# Patient Record
Sex: FEMALE | Race: WHITE | Employment: OTHER | URBAN - METROPOLITAN AREA
[De-identification: names, ages, dates, MRNs, and addresses within clinical notes are randomized per-mention and may not be internally consistent; named-entity substitution may affect disease eponyms.]

---

## 2017-06-05 ENCOUNTER — HOSPITAL ENCOUNTER (OUTPATIENT)
Dept: LAB | Age: 77
Discharge: HOME | End: 2017-06-05

## 2017-06-05 PROCEDURE — 88305 TISSUE EXAM BY PATHOLOGIST: CPT | Performed by: INTERNAL MEDICINE

## 2017-06-21 ENCOUNTER — HOSPITAL ENCOUNTER (OUTPATIENT)
Dept: MAMMOGRAPHY | Age: 77
Discharge: HOME | End: 2017-06-21
Attending: FAMILY MEDICINE
Payer: MEDICARE

## 2017-06-21 DIAGNOSIS — Z12.31 VISIT FOR SCREENING MAMMOGRAM: ICD-10-CM

## 2017-06-21 PROCEDURE — 77067 SCR MAMMO BI INCL CAD: CPT

## 2017-06-23 PROBLEM — E11.40 TYPE 2 DIABETES MELLITUS WITH DIABETIC NEUROPATHY, WITHOUT LONG-TERM CURRENT USE OF INSULIN (HCC): Chronic | Status: ACTIVE | Noted: 2017-06-23

## 2017-09-05 ENCOUNTER — HOSPITAL ENCOUNTER (OUTPATIENT)
Dept: MRI IMAGING | Age: 77
Discharge: HOME | End: 2017-09-05
Attending: FAMILY MEDICINE
Payer: MEDICARE

## 2017-09-05 DIAGNOSIS — G89.29 CHRONIC RIGHT-SIDED LOW BACK PAIN WITH RIGHT-SIDED SCIATICA: Chronic | ICD-10-CM

## 2017-09-05 DIAGNOSIS — M54.41 CHRONIC RIGHT-SIDED LOW BACK PAIN WITH RIGHT-SIDED SCIATICA: Chronic | ICD-10-CM

## 2017-09-05 PROCEDURE — 72148 MRI LUMBAR SPINE W/O DYE: CPT

## 2017-10-05 PROBLEM — M48.061 SPINAL STENOSIS OF LUMBAR REGION: Chronic | Status: ACTIVE | Noted: 2017-10-05

## 2017-10-05 PROBLEM — M25.551 RIGHT HIP PAIN: Status: ACTIVE | Noted: 2017-10-05

## 2017-10-11 ENCOUNTER — HOSPITAL ENCOUNTER (OUTPATIENT)
Dept: GENERAL RADIOLOGY | Age: 77
Discharge: HOME | End: 2017-10-11
Payer: MEDICARE

## 2017-10-11 DIAGNOSIS — M25.551 RIGHT HIP PAIN: ICD-10-CM

## 2017-10-11 DIAGNOSIS — M48.061 SPINAL STENOSIS OF LUMBAR REGION, UNSPECIFIED WHETHER NEUROGENIC CLAUDICATION PRESENT: ICD-10-CM

## 2017-10-11 PROCEDURE — 72110 X-RAY EXAM L-2 SPINE 4/>VWS: CPT

## 2017-10-11 PROCEDURE — 73502 X-RAY EXAM HIP UNI 2-3 VIEWS: CPT

## 2017-12-06 ENCOUNTER — HOSPITAL ENCOUNTER (OUTPATIENT)
Dept: SURGERY | Age: 77
Discharge: HOME | End: 2017-12-06
Payer: MEDICARE

## 2017-12-06 VITALS
OXYGEN SATURATION: 100 % | BODY MASS INDEX: 30.92 KG/M2 | HEART RATE: 94 BPM | DIASTOLIC BLOOD PRESSURE: 70 MMHG | TEMPERATURE: 97.9 F | SYSTOLIC BLOOD PRESSURE: 127 MMHG | HEIGHT: 66 IN | RESPIRATION RATE: 18 BRPM | WEIGHT: 192.38 LBS

## 2017-12-06 LAB
GLUCOSE BLD STRIP.AUTO-MCNC: 88 MG/DL (ref 65–100)
HGB BLD-MCNC: 14.3 G/DL (ref 11.7–15.4)
POTASSIUM SERPL-SCNC: 4.8 MMOL/L (ref 3.5–5.1)

## 2017-12-06 PROCEDURE — 84132 ASSAY OF SERUM POTASSIUM: CPT | Performed by: ANESTHESIOLOGY

## 2017-12-06 PROCEDURE — 85018 HEMOGLOBIN: CPT | Performed by: ANESTHESIOLOGY

## 2017-12-06 PROCEDURE — 82962 GLUCOSE BLOOD TEST: CPT

## 2017-12-06 NOTE — PERIOP NOTES
Patient verified name, , and surgery as listed in [facility]. Patient provided medical/health information and PTA medications to the best of their ability. TYPE  CASE:lB  Orders per surgeon: were received and dated 2017  Labs per surgeon:per anesthesia. Labs per anesthesia protocol: hgb and potassium collected and sent to [facility] glucose 88 . Instructed  Patient that if blood sugar 300 or > , surgery may be cancelled. EKG  :  EKG is not required per protocol. Patient denies any chest pain or shortness of breath on exertion    Patient provided with and instructed on education handouts including Guide to Surgery, blood transfusions, pain management, and hand hygiene for the family and community, and [facility] brochure. Hibiclens and antibacterial soap instructions given per hospital policy. Instructed patient to continue previous medications as prescribed prior to surgery unless otherwise directed and to take the following medications the day of surgery according to anesthesia guidelines : amlodipine, tessalon if needed, celexa, bentyl and gabapentin . Instructed patient to hold  the following medications:all vitamins and supplements 7 days prior to surgery including Centrum and all nsaids 5 days prior to surgery including Meloxicam.    Original medication prescription bottles were visualized during patient appointment. Patient teach back successful and patient demonstrates knowledge of instruction.

## 2017-12-11 ENCOUNTER — ANESTHESIA EVENT (OUTPATIENT)
Dept: SURGERY | Age: 77
DRG: 520 | End: 2017-12-11
Payer: MEDICARE

## 2017-12-11 PROBLEM — M71.38 SYNOVIAL CYST OF LUMBAR SPINE: Chronic | Status: ACTIVE | Noted: 2017-12-11

## 2017-12-12 ENCOUNTER — ANESTHESIA (OUTPATIENT)
Dept: SURGERY | Age: 77
DRG: 520 | End: 2017-12-12
Payer: MEDICARE

## 2017-12-12 ENCOUNTER — APPOINTMENT (OUTPATIENT)
Dept: GENERAL RADIOLOGY | Age: 77
DRG: 520 | End: 2017-12-12
Attending: NEUROLOGICAL SURGERY
Payer: MEDICARE

## 2017-12-12 ENCOUNTER — HOSPITAL ENCOUNTER (INPATIENT)
Age: 77
LOS: 1 days | Discharge: HOME | DRG: 520 | End: 2017-12-13
Attending: NEUROLOGICAL SURGERY | Admitting: NEUROLOGICAL SURGERY
Payer: MEDICARE

## 2017-12-12 LAB
GLUCOSE BLD STRIP.AUTO-MCNC: 107 MG/DL (ref 65–100)
GLUCOSE BLD STRIP.AUTO-MCNC: 107 MG/DL (ref 65–100)
GLUCOSE BLD STRIP.AUTO-MCNC: 167 MG/DL (ref 65–100)
GLUCOSE BLD STRIP.AUTO-MCNC: 261 MG/DL (ref 65–100)

## 2017-12-12 PROCEDURE — 74011250636 HC RX REV CODE- 250/636

## 2017-12-12 PROCEDURE — 82962 GLUCOSE BLOOD TEST: CPT

## 2017-12-12 PROCEDURE — 74011000250 HC RX REV CODE- 250: Performed by: NEUROLOGICAL SURGERY

## 2017-12-12 PROCEDURE — 77030014007 HC SPNG HEMSTAT J&J -B: Performed by: NEUROLOGICAL SURGERY

## 2017-12-12 PROCEDURE — 74011250636 HC RX REV CODE- 250/636: Performed by: ANESTHESIOLOGY

## 2017-12-12 PROCEDURE — 76210000003 HC OR PH I REC 3.5 TO 4 HR: Performed by: NEUROLOGICAL SURGERY

## 2017-12-12 PROCEDURE — 65270000029 HC RM PRIVATE

## 2017-12-12 PROCEDURE — 77030010507 HC ADH SKN DERMBND J&J -B: Performed by: NEUROLOGICAL SURGERY

## 2017-12-12 PROCEDURE — 74011250636 HC RX REV CODE- 250/636: Performed by: NEUROLOGICAL SURGERY

## 2017-12-12 PROCEDURE — 77030020782 HC GWN BAIR PAWS FLX 3M -B: Performed by: ANESTHESIOLOGY

## 2017-12-12 PROCEDURE — 76060000034 HC ANESTHESIA 1.5 TO 2 HR: Performed by: NEUROLOGICAL SURGERY

## 2017-12-12 PROCEDURE — 77030018836 HC SOL IRR NACL ICUM -A: Performed by: NEUROLOGICAL SURGERY

## 2017-12-12 PROCEDURE — 77030003666 HC NDL SPINAL BD -A: Performed by: NEUROLOGICAL SURGERY

## 2017-12-12 PROCEDURE — 77030032490 HC SLV COMPR SCD KNE COVD -B: Performed by: NEUROLOGICAL SURGERY

## 2017-12-12 PROCEDURE — 77030008703 HC TU ET UNCUF COVD -A: Performed by: ANESTHESIOLOGY

## 2017-12-12 PROCEDURE — 74011000250 HC RX REV CODE- 250

## 2017-12-12 PROCEDURE — 76010000161 HC OR TIME 1 TO 1.5 HR INTENSV-TIER 1: Performed by: NEUROLOGICAL SURGERY

## 2017-12-12 PROCEDURE — 77030011640 HC PAD GRND REM COVD -A: Performed by: NEUROLOGICAL SURGERY

## 2017-12-12 PROCEDURE — 77030019908 HC STETH ESOPH SIMS -A: Performed by: ANESTHESIOLOGY

## 2017-12-12 PROCEDURE — 74011250637 HC RX REV CODE- 250/637: Performed by: NEUROLOGICAL SURGERY

## 2017-12-12 PROCEDURE — 77030004391 HC BUR FLUT MEDT -C: Performed by: NEUROLOGICAL SURGERY

## 2017-12-12 PROCEDURE — 74011636637 HC RX REV CODE- 636/637: Performed by: NEUROLOGICAL SURGERY

## 2017-12-12 PROCEDURE — 77030003029 HC SUT VCRL J&J -B: Performed by: NEUROLOGICAL SURGERY

## 2017-12-12 PROCEDURE — 0SB00ZZ EXCISION OF LUMBAR VERTEBRAL JOINT, OPEN APPROACH: ICD-10-PCS | Performed by: NEUROLOGICAL SURGERY

## 2017-12-12 PROCEDURE — 01NB0ZZ RELEASE LUMBAR NERVE, OPEN APPROACH: ICD-10-PCS | Performed by: NEUROLOGICAL SURGERY

## 2017-12-12 RX ORDER — INSULIN LISPRO 100 [IU]/ML
INJECTION, SOLUTION INTRAVENOUS; SUBCUTANEOUS
Status: DISCONTINUED | OUTPATIENT
Start: 2017-12-12 | End: 2017-12-13 | Stop reason: HOSPADM

## 2017-12-12 RX ORDER — SODIUM CHLORIDE 0.9 % (FLUSH) 0.9 %
5-10 SYRINGE (ML) INJECTION AS NEEDED
Status: DISCONTINUED | OUTPATIENT
Start: 2017-12-12 | End: 2017-12-13 | Stop reason: HOSPADM

## 2017-12-12 RX ORDER — SODIUM CHLORIDE 0.9 % (FLUSH) 0.9 %
5-10 SYRINGE (ML) INJECTION AS NEEDED
Status: DISCONTINUED | OUTPATIENT
Start: 2017-12-12 | End: 2017-12-12 | Stop reason: HOSPADM

## 2017-12-12 RX ORDER — LIDOCAINE HYDROCHLORIDE AND EPINEPHRINE 10; 10 MG/ML; UG/ML
INJECTION, SOLUTION INFILTRATION; PERINEURAL AS NEEDED
Status: DISCONTINUED | OUTPATIENT
Start: 2017-12-12 | End: 2017-12-12 | Stop reason: HOSPADM

## 2017-12-12 RX ORDER — GABAPENTIN 300 MG/1
900 CAPSULE ORAL 3 TIMES DAILY
Status: DISCONTINUED | OUTPATIENT
Start: 2017-12-12 | End: 2017-12-13 | Stop reason: HOSPADM

## 2017-12-12 RX ORDER — HEPARIN SODIUM 5000 [USP'U]/ML
5000 INJECTION, SOLUTION INTRAVENOUS; SUBCUTANEOUS EVERY 12 HOURS
Status: DISCONTINUED | OUTPATIENT
Start: 2017-12-12 | End: 2017-12-13 | Stop reason: HOSPADM

## 2017-12-12 RX ORDER — CITALOPRAM 20 MG/1
40 TABLET, FILM COATED ORAL DAILY
Status: DISCONTINUED | OUTPATIENT
Start: 2017-12-13 | End: 2017-12-13 | Stop reason: HOSPADM

## 2017-12-12 RX ORDER — MELOXICAM 7.5 MG/1
7.5 TABLET ORAL DAILY
Status: DISCONTINUED | OUTPATIENT
Start: 2017-12-13 | End: 2017-12-13 | Stop reason: HOSPADM

## 2017-12-12 RX ORDER — SODIUM CHLORIDE 0.9 % (FLUSH) 0.9 %
5-10 SYRINGE (ML) INJECTION EVERY 8 HOURS
Status: DISCONTINUED | OUTPATIENT
Start: 2017-12-12 | End: 2017-12-12 | Stop reason: HOSPADM

## 2017-12-12 RX ORDER — LIDOCAINE HYDROCHLORIDE 20 MG/ML
INJECTION, SOLUTION EPIDURAL; INFILTRATION; INTRACAUDAL; PERINEURAL AS NEEDED
Status: DISCONTINUED | OUTPATIENT
Start: 2017-12-12 | End: 2017-12-12 | Stop reason: HOSPADM

## 2017-12-12 RX ORDER — SODIUM CHLORIDE, SODIUM LACTATE, POTASSIUM CHLORIDE, CALCIUM CHLORIDE 600; 310; 30; 20 MG/100ML; MG/100ML; MG/100ML; MG/100ML
1000 INJECTION, SOLUTION INTRAVENOUS CONTINUOUS
Status: DISCONTINUED | OUTPATIENT
Start: 2017-12-12 | End: 2017-12-12 | Stop reason: HOSPADM

## 2017-12-12 RX ORDER — ROCURONIUM BROMIDE 10 MG/ML
INJECTION, SOLUTION INTRAVENOUS AS NEEDED
Status: DISCONTINUED | OUTPATIENT
Start: 2017-12-12 | End: 2017-12-12 | Stop reason: HOSPADM

## 2017-12-12 RX ORDER — OXYCODONE AND ACETAMINOPHEN 5; 325 MG/1; MG/1
2 TABLET ORAL
Status: DISCONTINUED | OUTPATIENT
Start: 2017-12-12 | End: 2017-12-13 | Stop reason: HOSPADM

## 2017-12-12 RX ORDER — ONDANSETRON 2 MG/ML
4 INJECTION INTRAMUSCULAR; INTRAVENOUS
Status: DISCONTINUED | OUTPATIENT
Start: 2017-12-12 | End: 2017-12-13 | Stop reason: HOSPADM

## 2017-12-12 RX ORDER — DICYCLOMINE HYDROCHLORIDE 10 MG/1
10 CAPSULE ORAL 3 TIMES DAILY
Status: DISCONTINUED | OUTPATIENT
Start: 2017-12-12 | End: 2017-12-13 | Stop reason: HOSPADM

## 2017-12-12 RX ORDER — POTASSIUM CHLORIDE AND SODIUM CHLORIDE 450; 150 MG/100ML; MG/100ML
INJECTION, SOLUTION INTRAVENOUS CONTINUOUS
Status: DISCONTINUED | OUTPATIENT
Start: 2017-12-12 | End: 2017-12-13 | Stop reason: HOSPADM

## 2017-12-12 RX ORDER — FENTANYL CITRATE 50 UG/ML
INJECTION, SOLUTION INTRAMUSCULAR; INTRAVENOUS AS NEEDED
Status: DISCONTINUED | OUTPATIENT
Start: 2017-12-12 | End: 2017-12-12 | Stop reason: HOSPADM

## 2017-12-12 RX ORDER — PIOGLITAZONEHYDROCHLORIDE 15 MG/1
15 TABLET ORAL DAILY
Status: DISCONTINUED | OUTPATIENT
Start: 2017-12-13 | End: 2017-12-13 | Stop reason: HOSPADM

## 2017-12-12 RX ORDER — DEXAMETHASONE SODIUM PHOSPHATE 4 MG/ML
INJECTION, SOLUTION INTRA-ARTICULAR; INTRALESIONAL; INTRAMUSCULAR; INTRAVENOUS; SOFT TISSUE AS NEEDED
Status: DISCONTINUED | OUTPATIENT
Start: 2017-12-12 | End: 2017-12-12 | Stop reason: HOSPADM

## 2017-12-12 RX ORDER — NALOXONE HYDROCHLORIDE 0.4 MG/ML
0.1 INJECTION, SOLUTION INTRAMUSCULAR; INTRAVENOUS; SUBCUTANEOUS AS NEEDED
Status: DISCONTINUED | OUTPATIENT
Start: 2017-12-12 | End: 2017-12-12 | Stop reason: HOSPADM

## 2017-12-12 RX ORDER — HYDROCHLOROTHIAZIDE 25 MG/1
25 TABLET ORAL DAILY
Status: DISCONTINUED | OUTPATIENT
Start: 2017-12-13 | End: 2017-12-13 | Stop reason: HOSPADM

## 2017-12-12 RX ORDER — FENTANYL CITRATE 50 UG/ML
100 INJECTION, SOLUTION INTRAMUSCULAR; INTRAVENOUS AS NEEDED
Status: DISCONTINUED | OUTPATIENT
Start: 2017-12-12 | End: 2017-12-12 | Stop reason: HOSPADM

## 2017-12-12 RX ORDER — NEOSTIGMINE METHYLSULFATE 1 MG/ML
INJECTION, SOLUTION INTRAVENOUS AS NEEDED
Status: DISCONTINUED | OUTPATIENT
Start: 2017-12-12 | End: 2017-12-12 | Stop reason: HOSPADM

## 2017-12-12 RX ORDER — SODIUM CHLORIDE, SODIUM LACTATE, POTASSIUM CHLORIDE, CALCIUM CHLORIDE 600; 310; 30; 20 MG/100ML; MG/100ML; MG/100ML; MG/100ML
75 INJECTION, SOLUTION INTRAVENOUS CONTINUOUS
Status: DISCONTINUED | OUTPATIENT
Start: 2017-12-12 | End: 2017-12-12 | Stop reason: HOSPADM

## 2017-12-12 RX ORDER — SIMVASTATIN 20 MG/1
20 TABLET, FILM COATED ORAL
Status: DISCONTINUED | OUTPATIENT
Start: 2017-12-12 | End: 2017-12-13 | Stop reason: HOSPADM

## 2017-12-12 RX ORDER — ONDANSETRON 2 MG/ML
INJECTION INTRAMUSCULAR; INTRAVENOUS AS NEEDED
Status: DISCONTINUED | OUTPATIENT
Start: 2017-12-12 | End: 2017-12-12 | Stop reason: HOSPADM

## 2017-12-12 RX ORDER — ONDANSETRON 2 MG/ML
4 INJECTION INTRAMUSCULAR; INTRAVENOUS ONCE
Status: DISCONTINUED | OUTPATIENT
Start: 2017-12-12 | End: 2017-12-12 | Stop reason: HOSPADM

## 2017-12-12 RX ORDER — AMLODIPINE BESYLATE 5 MG/1
2.5 TABLET ORAL DAILY
Status: DISCONTINUED | OUTPATIENT
Start: 2017-12-13 | End: 2017-12-13 | Stop reason: HOSPADM

## 2017-12-12 RX ORDER — MIDAZOLAM HYDROCHLORIDE 1 MG/ML
2 INJECTION, SOLUTION INTRAMUSCULAR; INTRAVENOUS
Status: DISCONTINUED | OUTPATIENT
Start: 2017-12-12 | End: 2017-12-12 | Stop reason: HOSPADM

## 2017-12-12 RX ORDER — CEFAZOLIN SODIUM IN 0.9 % NACL 2 G/100 ML
2 PLASTIC BAG, INJECTION (ML) INTRAVENOUS ONCE
Status: COMPLETED | OUTPATIENT
Start: 2017-12-12 | End: 2017-12-12

## 2017-12-12 RX ORDER — AMOXICILLIN 250 MG
2 CAPSULE ORAL DAILY
Status: DISCONTINUED | OUTPATIENT
Start: 2017-12-13 | End: 2017-12-13 | Stop reason: HOSPADM

## 2017-12-12 RX ORDER — LISINOPRIL 20 MG/1
20 TABLET ORAL DAILY
Status: DISCONTINUED | OUTPATIENT
Start: 2017-12-13 | End: 2017-12-13 | Stop reason: HOSPADM

## 2017-12-12 RX ORDER — CLONIDINE HYDROCHLORIDE 0.1 MG/1
0.1 TABLET ORAL
Status: DISCONTINUED | OUTPATIENT
Start: 2017-12-12 | End: 2017-12-13 | Stop reason: HOSPADM

## 2017-12-12 RX ORDER — HYDROMORPHONE HYDROCHLORIDE 2 MG/ML
0.5 INJECTION, SOLUTION INTRAMUSCULAR; INTRAVENOUS; SUBCUTANEOUS
Status: DISCONTINUED | OUTPATIENT
Start: 2017-12-12 | End: 2017-12-12 | Stop reason: HOSPADM

## 2017-12-12 RX ORDER — SODIUM CHLORIDE 0.9 % (FLUSH) 0.9 %
5-10 SYRINGE (ML) INJECTION EVERY 8 HOURS
Status: DISCONTINUED | OUTPATIENT
Start: 2017-12-12 | End: 2017-12-13 | Stop reason: HOSPADM

## 2017-12-12 RX ORDER — ACETAMINOPHEN 500 MG
500 TABLET ORAL ONCE
Status: DISCONTINUED | OUTPATIENT
Start: 2017-12-12 | End: 2017-12-12 | Stop reason: HOSPADM

## 2017-12-12 RX ORDER — METFORMIN HYDROCHLORIDE 500 MG/1
1000 TABLET ORAL 2 TIMES DAILY WITH MEALS
Status: DISCONTINUED | OUTPATIENT
Start: 2017-12-12 | End: 2017-12-13 | Stop reason: HOSPADM

## 2017-12-12 RX ORDER — OXYCODONE HYDROCHLORIDE 5 MG/1
5 TABLET ORAL
Status: DISCONTINUED | OUTPATIENT
Start: 2017-12-12 | End: 2017-12-12 | Stop reason: HOSPADM

## 2017-12-12 RX ORDER — OXYCODONE HYDROCHLORIDE 5 MG/1
10 TABLET ORAL
Status: DISCONTINUED | OUTPATIENT
Start: 2017-12-12 | End: 2017-12-12 | Stop reason: HOSPADM

## 2017-12-12 RX ORDER — GLYCOPYRROLATE 0.2 MG/ML
INJECTION INTRAMUSCULAR; INTRAVENOUS AS NEEDED
Status: DISCONTINUED | OUTPATIENT
Start: 2017-12-12 | End: 2017-12-12 | Stop reason: HOSPADM

## 2017-12-12 RX ORDER — PROPOFOL 10 MG/ML
INJECTION, EMULSION INTRAVENOUS AS NEEDED
Status: DISCONTINUED | OUTPATIENT
Start: 2017-12-12 | End: 2017-12-12 | Stop reason: HOSPADM

## 2017-12-12 RX ORDER — NALOXONE HYDROCHLORIDE 0.4 MG/ML
0.4 INJECTION, SOLUTION INTRAMUSCULAR; INTRAVENOUS; SUBCUTANEOUS AS NEEDED
Status: DISCONTINUED | OUTPATIENT
Start: 2017-12-12 | End: 2017-12-13 | Stop reason: HOSPADM

## 2017-12-12 RX ORDER — CYCLOBENZAPRINE HCL 10 MG
10 TABLET ORAL
Status: DISCONTINUED | OUTPATIENT
Start: 2017-12-12 | End: 2017-12-13 | Stop reason: HOSPADM

## 2017-12-12 RX ORDER — ACETAMINOPHEN 325 MG/1
650 TABLET ORAL
Status: DISCONTINUED | OUTPATIENT
Start: 2017-12-12 | End: 2017-12-13 | Stop reason: HOSPADM

## 2017-12-12 RX ORDER — DIPHENHYDRAMINE HYDROCHLORIDE 50 MG/ML
12.5 INJECTION, SOLUTION INTRAMUSCULAR; INTRAVENOUS ONCE
Status: DISCONTINUED | OUTPATIENT
Start: 2017-12-12 | End: 2017-12-12 | Stop reason: HOSPADM

## 2017-12-12 RX ORDER — MORPHINE SULFATE 10 MG/ML
10 INJECTION, SOLUTION INTRAMUSCULAR; INTRAVENOUS
Status: DISCONTINUED | OUTPATIENT
Start: 2017-12-12 | End: 2017-12-13 | Stop reason: HOSPADM

## 2017-12-12 RX ORDER — LIDOCAINE HYDROCHLORIDE 10 MG/ML
0.1 INJECTION INFILTRATION; PERINEURAL AS NEEDED
Status: DISCONTINUED | OUTPATIENT
Start: 2017-12-12 | End: 2017-12-12 | Stop reason: HOSPADM

## 2017-12-12 RX ADMIN — ROCURONIUM BROMIDE 10 MG: 10 INJECTION, SOLUTION INTRAVENOUS at 11:53

## 2017-12-12 RX ADMIN — ONDANSETRON 4 MG: 2 INJECTION INTRAMUSCULAR; INTRAVENOUS at 12:24

## 2017-12-12 RX ADMIN — SIMVASTATIN 20 MG: 20 TABLET, FILM COATED ORAL at 23:13

## 2017-12-12 RX ADMIN — ROCURONIUM BROMIDE 40 MG: 10 INJECTION, SOLUTION INTRAVENOUS at 11:35

## 2017-12-12 RX ADMIN — GABAPENTIN 900 MG: 300 CAPSULE ORAL at 18:00

## 2017-12-12 RX ADMIN — SODIUM CHLORIDE AND POTASSIUM CHLORIDE: 4.5; 1.49 INJECTION, SOLUTION INTRAVENOUS at 18:03

## 2017-12-12 RX ADMIN — Medication 10 ML: at 23:15

## 2017-12-12 RX ADMIN — OXYCODONE HYDROCHLORIDE AND ACETAMINOPHEN 2 TABLET: 5; 325 TABLET ORAL at 23:29

## 2017-12-12 RX ADMIN — Medication 10 ML: at 17:00

## 2017-12-12 RX ADMIN — OXYCODONE HYDROCHLORIDE AND ACETAMINOPHEN 2 TABLET: 5; 325 TABLET ORAL at 18:10

## 2017-12-12 RX ADMIN — FENTANYL CITRATE 100 MCG: 50 INJECTION, SOLUTION INTRAMUSCULAR; INTRAVENOUS at 11:33

## 2017-12-12 RX ADMIN — DICYCLOMINE HYDROCHLORIDE 10 MG: 10 CAPSULE ORAL at 23:13

## 2017-12-12 RX ADMIN — NEOSTIGMINE METHYLSULFATE 3 MG: 1 INJECTION, SOLUTION INTRAVENOUS at 12:46

## 2017-12-12 RX ADMIN — METFORMIN HYDROCHLORIDE 1000 MG: 500 TABLET, FILM COATED ORAL at 18:00

## 2017-12-12 RX ADMIN — LIDOCAINE HYDROCHLORIDE 100 MG: 20 INJECTION, SOLUTION EPIDURAL; INFILTRATION; INTRACAUDAL; PERINEURAL at 11:35

## 2017-12-12 RX ADMIN — PROPOFOL 200 MG: 10 INJECTION, EMULSION INTRAVENOUS at 11:35

## 2017-12-12 RX ADMIN — GLYCOPYRROLATE 0.4 MG: 0.2 INJECTION INTRAMUSCULAR; INTRAVENOUS at 12:46

## 2017-12-12 RX ADMIN — HEPARIN SODIUM 5000 UNITS: 5000 INJECTION, SOLUTION INTRAVENOUS; SUBCUTANEOUS at 23:13

## 2017-12-12 RX ADMIN — DICYCLOMINE HYDROCHLORIDE 10 MG: 10 CAPSULE ORAL at 18:00

## 2017-12-12 RX ADMIN — GABAPENTIN 900 MG: 300 CAPSULE ORAL at 23:13

## 2017-12-12 RX ADMIN — CEFAZOLIN 2 G: 10 INJECTION, POWDER, FOR SOLUTION INTRAVENOUS; PARENTERAL at 11:40

## 2017-12-12 RX ADMIN — DEXAMETHASONE SODIUM PHOSPHATE 4 MG: 4 INJECTION, SOLUTION INTRA-ARTICULAR; INTRALESIONAL; INTRAMUSCULAR; INTRAVENOUS; SOFT TISSUE at 11:42

## 2017-12-12 RX ADMIN — SODIUM CHLORIDE, SODIUM LACTATE, POTASSIUM CHLORIDE, AND CALCIUM CHLORIDE 1000 ML: 600; 310; 30; 20 INJECTION, SOLUTION INTRAVENOUS at 10:40

## 2017-12-12 RX ADMIN — SODIUM CHLORIDE, SODIUM LACTATE, POTASSIUM CHLORIDE, AND CALCIUM CHLORIDE 75 ML: 600; 310; 30; 20 INJECTION, SOLUTION INTRAVENOUS at 13:43

## 2017-12-12 NOTE — IP AVS SNAPSHOT
Jacobo Ego 
 
 
 [address] [facility] [address] [facility] 
[phone] Patient: Kenny Wesley MRN: [id number] P:[id number] About your hospitalization You were admitted on:  December 12, 2017 You last received care in the:  [facility] 7 MED SURG You were discharged on:  December 13, 2017 Why you were hospitalized Your primary diagnosis was:  Not on File Your diagnoses also included:  Synovial Cyst Of Lumbar Spine Things You Need To Do (next 8 weeks) Follow up with Sofia Khan MD  
  
Phone:  [phone] Where:  [facility] [address] Thursday Dec 21, 2017 WOUND CHECK with [facility] NURSE at  9:30 AM  
Where:  [facility] Follow up with Leona Hargrove MD  
at 9:30 a.m. Phone:  [phone] Where:  [place], [address] [employer] [facility], [address], [address] [address] Discharge Orders None A check reinier indicates which time of day the medication should be taken. My Medications STOP taking these medications HYDROcodone-acetaminophen 5-325 mg per tablet Commonly known as:  [address] these medications as instructed Instructions Each Dose to Equal  
 Morning Noon Evening Bedtime  
 amLODIPine 2.5 mg tablet Commonly known as:  Doll German Your next dose is:  Tomorrow Morning Take 1 Tab by mouth daily. Indications: hypertension 2.5 mg CENTRUM SILVER PO Your next dose is:  Tomorrow Morning Take 1 Tab by mouth daily. 1 Tab  
    
  
   
   
   
  
 citalopram 40 mg tablet Commonly known as:  Viona Creamer Your next dose is:  Tomorrow Morning TAKE 1 TABLET BY MOUTH DAILY. cyclobenzaprine 10 mg tablet Commonly known as:  FLEXERIL Your next dose is: Take on as needed schedule TAKE 1 TABLET THREE TIMES DAILY AS NEEDED FOR MUSCLE SPASM(S)  
     
   
   
   
  
 dicyclomine 10 mg capsule Commonly known as:  BENTYL Your next dose is:  TODAY with meals TAKE 1 CAPSULE THREE TIMES DAILY  
     
  
   
  
   
  
   
  
 gabapentin 300 mg capsule Commonly known as:  NEURONTIN Your next dose is:  TODAY evening and bedtime Take 3 Caps by mouth three (3) times daily. 900 mg  
    
  
   
   
  
   
  
  
 glipiZIDE 5 mg tablet Commonly known as:  Colette Isles Your next dose is: This evening TAKE 1 TABLET TWICE DAILY  
     
  
   
   
  
   
  
 hydroCHLOROthiazide 25 mg tablet Commonly known as:  HYDRODIURIL Your next dose is:  Tomorrow Morning TAKE 1 TABLET BY MOUTH EVERY DAY  
     
  
   
   
   
  
 lisinopril 20 mg tablet Commonly known as:  Renetta Hernandez Your next dose is:  Tomorrow Morning Take  by mouth daily. meloxicam 7.5 mg tablet Commonly known as:  MOBIC Your next dose is:  Tomorrow Morning Take 1 Tab by mouth daily. 7.5 mg  
    
  
   
   
   
  
 metFORMIN 1,000 mg tablet Commonly known as:  GLUCOPHAGE Your next dose is: This evening TAKE 1 TABLET TWICE DAILY WITH MEALS  
     
  
   
   
  
   
  
 oxyCODONE-acetaminophen 5-325 mg per tablet Commonly known as:  PERCOCET Your next dose is: Take on as needed schedule 1-2 tabs po q4-6h prn pain  
     
   
   
   
  
 pioglitazone 15 mg tablet Commonly known as:  ACTOS Your next dose is:  Tomorrow Morning TAKE 1 TABLET EVERY DAY  
     
  
   
   
   
  
 senna-docusate 8.6-50 mg per tablet Commonly known as:  Gurvinder Sequeira Your next dose is:  Tomorrow Morning Take 2 Tabs by mouth daily. 2 Tab  
    
  
   
   
   
  
 simvastatin 40 mg tablet Commonly known as:  ZOCOR Your next dose is: Take tonight TAKE 1 TABLET EVERY NIGHT Where to Get Your Medications Information on where to get these meds will be given to you by the nurse or doctor. ! Ask your nurse or doctor about these medications  
  oxyCODONE-acetaminophen 5-325 mg per tablet  
 senna-docusate 8.6-50 mg per tablet Discharge Instructions MAY shower-->NO tub baths CHANGE dressing DAILYremove dressing-->shower-->apply new dressing NO lifting anything heavier than 5LBS  
 
WEAR brace at all times EXCEPT when in bed, just going to the bathroom or showering NO Bending, Lifting or Twisting Avoid sitting more than 20 - 30 minutes at a time NO driving until directed by Dr. Samantha Knox DO NOT take any NSAIDS (either prescribed or over the counter until directed (Aleve, Ibuprofen, Mobic, etc) as this will interfere with bone healing CALL DR. Yang if:  Fever >100.5 
([phone])               Incision becomes red, swollen or opens up Incision has yellow, thick drainage or an odor Pain is not managed with prescribed medications Excessive nausea and/or vomiting Avoid having pets sleep in bed with you until incision is completely healed DISCHARGE SUMMARY from Nurse PATIENT INSTRUCTIONS: 
 
 
F-face looks uneven A-arms unable to move or move unevenly S-speech slurred or non-existent T-time-call 911 as soon as signs and symptoms begin-DO NOT go Back to bed or wait to see if you get better-TIME IS BRAIN. Warning Signs of HEART ATTACK Call 911 if you have these symptoms: 
? Chest discomfort. Most heart attacks involve discomfort in the center of the chest that lasts more than a few minutes, or that goes away and comes back. It can feel like uncomfortable pressure, squeezing, fullness, or pain. ? Discomfort in other areas of the upper body. Symptoms can include pain or discomfort in one or both arms, the back, neck, jaw, or stomach. ? Shortness of breath with or without chest discomfort. ? Other signs may include breaking out in a cold sweat, nausea, or lightheadedness. Don't wait more than five minutes to call [address]! Fast action can save your life. Calling 911 is almost always the fastest way to get lifesaving treatment. Emergency Medical Services staff can begin treatment when they arrive  up to an hour sooner than if someone gets to the hospital by car. The discharge information has been reviewed with the patient. The patient verbalized understanding. Discharge medications reviewed with the patient and appropriate educational materials and side effects teaching were provided. ___________________________________________________________________________________________________________________________________ Introducing [facility]! Gracy Arreola introduces [employer] patient portal. Now you can access parts of your medical record, email your doctor's office, and request medication refills online. 1. In your internet browser, go to [email or web address] 2. Click on the First Time User? Click Here link in the Sign In box. You will see the New Member Sign Up page. 3. Enter your [employer] Access Code exactly as it appears below. You will not need to use this code after youve completed the sign-up process. If you do not sign up before the expiration date, you must request a new code. · [employer] Access Code: [email or web address] Expires: 2/3/2018 12:27 PM 
 
4. Enter the last four digits of your Social Security Number (xxxx) and Date of Birth (mm/dd/yyyy) as indicated and click Submit. You will be taken to the next sign-up page. 5. Create a MyChart ID.  This will be your [employer] login ID and cannot be changed, so think of one that is secure and easy to remember. 6. Create a [employer] password. You can change your password at any time. 7. Enter your Password Reset Question and Answer. This can be used at a later time if you forget your password. 8. Enter your e-mail address. You will receive e-mail notification when new information is available in [address] 9. Click Sign Up. You can now view and download portions of your medical record. 10. Click the Download Summary menu link to download a portable copy of your medical information. If you have questions, please visit the Frequently Asked Questions section of the [employer] website. Remember, [employer] is NOT to be used for urgent needs. For medical emergencies, dial 911. Now available from your iPhone and Android! Unresulted Labs-Please follow up with your PCP about these lab tests Order Current Status [place] X[facility] TECHNOLOGIST SERVICE In process Providers Seen During Your Hospitalization Provider Specialty Primary office phone Carlos Baca MD Neurosurgery [phone] Your Primary Care Physician (PCP) Primary Care Physician Office Phone Office Fax Maribell Gonsalo [phone] You are allergic to the following Allergen Reactions Amoxicillin Vertigo Januvia (Sitagliptin) Myalgia Lyrica (Pregabalin) Swelling Recent Documentation Breastfeeding? OB Status Smoking Status No Hysterectomy Current Every Day Smoker Emergency Contacts Name Discharge Info Relation Home Work Mobile Lucy Pabon DISCHARGE CAREGIVER [3] Child [2] [phone] Patient Belongings The following personal items are in your possession at time of discharge: 
  Dental Appliances: Lowers, Uppers, With patient  Visual Aid: Glasses   Hearing Aids/Status: Does not own Please provide this summary of care documentation to your next provider. Signatures-by signing, you are acknowledging that this After Visit Summary has been reviewed with you and you have received a copy. Patient Signature:  ____________________________________________________________ Date:  ____________________________________________________________  
  
[employer] Provider Signature:  ____________________________________________________________ Date:  ____________________________________________________________

## 2017-12-12 NOTE — ANESTHESIA POSTPROCEDURE EVALUATION
Post-Anesthesia Evaluation and Assessment    Patient: Abelardo Castillo MRN: [id number]  SSN: [id number]    YOB: 1940  Age: 78 y.o. Sex: female       Cardiovascular Function/Vital Signs  Visit Vitals    /71    Pulse 75    Temp 36.6 °C (97.8 °F)    Resp 28    SpO2 98%       Patient is status post general anesthesia for Procedure(s):  RIGHT L3 4 LAMINOTOMY FOR SYNOVIAL CYST. Nausea/Vomiting: None    Postoperative hydration reviewed and adequate. Pain:  Pain Scale 1: Numeric (0 - 10) (12/12/17 [facility])  Pain Intensity 1: 0 (12/12/17 1355)   Managed    Neurological Status:   Neuro (WDL): Exceptions to WDL (12/12/17 1355)  Neuro  Neurologic State: Drowsy (12/12/17 1355)  LUE Motor Response: Purposeful (12/12/17 1355)  LLE Motor Response: Purposeful (12/12/17 1355)  RUE Motor Response: Purposeful (12/12/17 [facility])  RLE Motor Response: Purposeful (12/12/17 1355)   At baseline    Mental Status and Level of Consciousness: Arousable    Pulmonary Status:   O2 Device: Nasal cannula (12/12/17 1355)   Adequate oxygenation and airway patent    Complications related to anesthesia: None    Post-anesthesia assessment completed.  No concerns    Signed By: Lenore Busch MD     December 12, 2017

## 2017-12-12 NOTE — PROGRESS NOTES
provided pre-op prayer visit. Sister and daughter were present for visit. Pt  Shared her francisco.  provided spiritual care through presence, active listening, pastoral conversation and prayer.

## 2017-12-12 NOTE — INTERVAL H&P NOTE
H&P Update: Colin Diaz was seen and examined. History and physical has been reviewed. The patient has been examined.  There have been no significant clinical changes since the completion of the originally dated History and Physical.    Signed By: Kayden Block MD     December 12, 2017 11:07 AM

## 2017-12-12 NOTE — ROUTINE PROCESS
TRANSFER - IN REPORT:    Verbal report received from Kelly Staley RN on Trini Sharif  being received from PACU for routine post - op      Report consisted of patients Situation, Background, Assessment and   Recommendations(SBAR). Information from the following report(s) SBAR, Kardex and MAR was reviewed with the receiving nurse. Opportunity for questions and clarification was provided. Report handed to primary nurse, Alena Bojorquez.

## 2017-12-12 NOTE — ANESTHESIA PREPROCEDURE EVALUATION
Anesthetic History   No history of anesthetic complications            Review of Systems / Medical History  Patient summary reviewed and pertinent labs reviewed    Pulmonary          Smoker         Neuro/Psych         Psychiatric history     Cardiovascular    Hypertension              Exercise tolerance: >4 METS     GI/Hepatic/Renal     GERD           Endo/Other    Diabetes    Obesity and arthritis     Other Findings              Physical Exam    Airway  Mallampati: II  TM Distance: 4 - 6 cm  Neck ROM: normal range of motion   Mouth opening: Normal     Cardiovascular    Rhythm: regular  Rate: normal         Dental  No notable dental hx       Pulmonary  Breath sounds clear to auscultation               Abdominal  GI exam deferred       Other Findings            Anesthetic Plan    ASA: 2  Anesthesia type: general          Induction: Intravenous  Anesthetic plan and risks discussed with: Patient

## 2017-12-12 NOTE — OP NOTES
Montana Heller [id number]  [id number]    1940  78 y.o.  female       Operative Report    Date of Surgery: 12/12/2017     Preoperative Diagnosis: RIGHT L3-4 SYNOVIAL CYST WITH NERVE ROOT COMPRESSION    Postoperative Diagnosis: SAME    Surgeon(s) and Role:     * Herbert Dooley MD - Primary    Anesthesia: General     Procedure:   1. Right L3-L4 partial hemilaminectomy, medial facetectomy and foraminotomy for removal of synovial cyst and decompression of nerves. 2. Microsurgery. 3. Intraoperative fluoroscopic guidance. Procedure in Detail:   After appropriate informed consent was obtained, the patient was taken to the operating suite where satisfactory general anesthesia was induced. The patient was then turned into the prone position on the operating table on chest rolls. All pressure points were carefully padded. Perioperative antibiotics were given. The lumbar region was prepped and draped in the usual sterile fashion. Intraoperative fluoroscopy was used to localize the L3-L4 level. The skin was infiltrated with 1% lidocaine with epinephrine. A 16 mm vertical linear incision was then made directly over the L3-L4 level 1.5 cm to the right of midline. Dissection was carried down through the subcutaneous tissue. The fascia was perforated using a K wire. The sequential dilators were passed over the K wire and were docked on the L3-L4 laminae under fluoroscopic guidance. A tubular retractor was passed over the dilators and was also docked on the L3-L4 laminae under fluoroscopic guidance. The retractor was secured firmly in place using the articulating arm. The dilators were removed from the wound. The operating microscope was brought into the field. Next, a small amount of soft tissue was removed from the right L3-L4 laminae. The Midas-Corbin high speed drill and Kerrison rongeur were used to perform a right L3-L4 partial hemilaminectomy. A medial facetectomy was also carried out.   The thickened ligament was removed with care being given to protection of the underlying neural elements. A moderately large synovial cyst was identified filling the lateral recess. The right L4 nerve root was identified and was gently mobilized using microsurgical technique. The cyst was then carefully dissected away from the nerve root and was completely removed in a piecemeal fashion. A generous foraminotomy was carried out over the right L4 nerve root. The nerves were then inspected and were found to be completely free. The wound was then irrigated copiously with antibiotic solution. Meticulous hemostasis was obtained. The operating microscope was removed from the field. The tubular retractor was removed from the wound. The fascia was closed using a single 0 Vicryl suture. The subcutaneous tissue was closed in a layered fashion. The skin edges were approximated using Dermabond. A sterile dressing was applied overall. The patient was then turned back into the supine position on the stretcher where satisfactory general anesthesia was reversed. The patient was then taken to the recovery room in satisfactory condition. Estimated Blood Loss:   25cc           Specimens:  None    Drains: None                Complications: None    Counts: Sponge and needle counts were correct times two.     Signed By:  Calli Livingston MD     December 12, 2017

## 2017-12-12 NOTE — PERIOP NOTES
TRANSFER - OUT REPORT:    Verbal report given to Diamante Joseph (name) on [facility]  being transferred to [facility](unit) for routine post - op       Report consisted of patients Situation, Background, Assessment and   Recommendations(SBAR). Information from the following report(s) SBAR, OR Summary, Intake/Output and MAR was reviewed with the receiving nurse. Lines:   Peripheral IV 12/12/17 Left Hand (Active)   Site Assessment Clean, dry, & intact 12/12/2017  1:55 PM   Phlebitis Assessment 0 12/12/2017  1:55 PM   Infiltration Assessment 0 12/12/2017  1:55 PM   Dressing Status Clean, dry, & intact 12/12/2017  1:55 PM   Dressing Type Transparent 12/12/2017  1:55 PM   Hub Color/Line Status Infusing 12/12/2017  1:55 PM        Opportunity for questions and clarification was provided. Patient transported with:   O2 @ 2 liters    VTE prophylaxis orders have been written for [facility]. Patient and family given floor number and nurses name. Family updated re: pt status after security code verified.

## 2017-12-13 VITALS
SYSTOLIC BLOOD PRESSURE: 121 MMHG | OXYGEN SATURATION: 94 % | RESPIRATION RATE: 20 BRPM | DIASTOLIC BLOOD PRESSURE: 68 MMHG | HEART RATE: 77 BPM | TEMPERATURE: 98.3 F

## 2017-12-13 LAB — GLUCOSE BLD STRIP.AUTO-MCNC: 102 MG/DL (ref 65–100)

## 2017-12-13 PROCEDURE — 74011250637 HC RX REV CODE- 250/637: Performed by: NEUROLOGICAL SURGERY

## 2017-12-13 PROCEDURE — 82962 GLUCOSE BLOOD TEST: CPT

## 2017-12-13 RX ORDER — AMOXICILLIN 250 MG
2 CAPSULE ORAL DAILY
Qty: 30 TAB | Refills: 1 | Status: SHIPPED | OUTPATIENT
Start: 2017-12-13 | End: 2018-06-29

## 2017-12-13 RX ORDER — OXYCODONE AND ACETAMINOPHEN 5; 325 MG/1; MG/1
TABLET ORAL
Qty: 40 TAB | Refills: 0 | Status: SHIPPED | OUTPATIENT
Start: 2017-12-13 | End: 2018-06-29

## 2017-12-13 RX ADMIN — AMLODIPINE BESYLATE 2.5 MG: 5 TABLET ORAL at 08:06

## 2017-12-13 RX ADMIN — MELOXICAM 7.5 MG: 7.5 TABLET ORAL at 08:06

## 2017-12-13 RX ADMIN — CITALOPRAM HYDROBROMIDE 40 MG: 20 TABLET ORAL at 08:05

## 2017-12-13 RX ADMIN — PIOGLITAZONE HYDROCHLORIDE 15 MG: 15 TABLET ORAL at 08:05

## 2017-12-13 RX ADMIN — DICYCLOMINE HYDROCHLORIDE 10 MG: 10 CAPSULE ORAL at 08:05

## 2017-12-13 RX ADMIN — Medication 1 TABLET: at 08:05

## 2017-12-13 RX ADMIN — Medication 10 ML: at 05:03

## 2017-12-13 RX ADMIN — LISINOPRIL 20 MG: 20 TABLET ORAL at 08:06

## 2017-12-13 RX ADMIN — HYDROCHLOROTHIAZIDE 25 MG: 25 TABLET ORAL at 08:06

## 2017-12-13 RX ADMIN — METFORMIN HYDROCHLORIDE 1000 MG: 500 TABLET, FILM COATED ORAL at 08:05

## 2017-12-13 RX ADMIN — OXYCODONE HYDROCHLORIDE AND ACETAMINOPHEN 2 TABLET: 5; 325 TABLET ORAL at 05:11

## 2017-12-13 RX ADMIN — STANDARDIZED SENNA CONCENTRATE AND DOCUSATE SODIUM 2 TABLET: 8.6; 5 TABLET, FILM COATED ORAL at 08:05

## 2017-12-13 RX ADMIN — GABAPENTIN 900 MG: 300 CAPSULE ORAL at 08:05

## 2017-12-13 NOTE — DISCHARGE INSTRUCTIONS
MAY shower-->NO tub baths    CHANGE dressing DAILY--remove dressing-->shower-->apply new dressing    NO lifting anything heavier than 5LBS     WEAR brace at all times EXCEPT when in bed, just going to the bathroom or showering     NO Bending, Lifting or Twisting    Avoid sitting more than 20 - 30 minutes at a time    NO driving until directed by Dr. Mercedes Prudent    DO NOT take any NSAIDS (either prescribed or over the counter until directed    (Aleve, Ibuprofen, Mobic, etc) as this will interfere with bone healing    CALL DR. Mercedes Prudent if:  Fever >100.5  ([phone])               Incision becomes red, swollen or opens up             Incision has yellow, thick drainage or an odor             Pain is not managed with prescribed medications             Excessive nausea and/or vomiting    Avoid having pets sleep in bed with you until incision is completely healed    DISCHARGE SUMMARY from Nurse    PATIENT INSTRUCTIONS:    After general anesthesia or intravenous sedation, for 24 hours or while taking prescription Narcotics:  · Limit your activities  · Do not drive and operate hazardous machinery  · Do not make important personal or business decisions  · Do  not drink alcoholic beverages  · If you have not urinated within 8 hours after discharge, please contact your surgeon on call. Report the following to your surgeon:  · Excessive pain, swelling, redness or odor of or around the surgical area  · Temperature over 100.5  · Nausea and vomiting lasting longer than 4 hours or if unable to take medications  · Any signs of decreased circulation or nerve impairment to extremity: change in color, persistent  numbness, tingling, coldness or increase pain  · Any questions    What to do at Home:  Recommended activity: See surgical instructions, diet as tolerated. *  Please give a list of your current medications to your Primary Care Provider.     *  Please update this list whenever your medications are discontinued, doses are      changed, or new medications (including over-the-counter products) are added. *  Please carry medication information at all times in case of emergency situations. These are general instructions for a healthy lifestyle:    No smoking/ No tobacco products/ Avoid exposure to second hand smoke  Surgeon General's Warning:  Quitting smoking now greatly reduces serious risk to your health. Obesity, smoking, and sedentary lifestyle greatly increases your risk for illness    A healthy diet, regular physical exercise & weight monitoring are important for maintaining a healthy lifestyle    You may be retaining fluid if you have a history of heart failure or if you experience any of the following symptoms:  Weight gain of 3 pounds or more overnight or 5 pounds in a week, increased swelling in our hands or feet or shortness of breath while lying flat in bed. Please call your doctor as soon as you notice any of these symptoms; do not wait until your next office visit. Recognize signs and symptoms of STROKE:    F-face looks uneven    A-arms unable to move or move unevenly    S-speech slurred or non-existent    T-time-call 911 as soon as signs and symptoms begin-DO NOT go       Back to bed or wait to see if you get better-TIME IS BRAIN. Warning Signs of HEART ATTACK     Call 911 if you have these symptoms:   Chest discomfort. Most heart attacks involve discomfort in the center of the chest that lasts more than a few minutes, or that goes away and comes back. It can feel like uncomfortable pressure, squeezing, fullness, or pain.  Discomfort in other areas of the upper body. Symptoms can include pain or discomfort in one or both arms, the back, neck, jaw, or stomach.  Shortness of breath with or without chest discomfort.  Other signs may include breaking out in a cold sweat, nausea, or lightheadedness. Don't wait more than five minutes to call 911 - MINUTES MATTER! Fast action can save your life.  Calling 911 is almost always the fastest way to get lifesaving treatment. Emergency Medical Services staff can begin treatment when they arrive -- up to an hour sooner than if someone gets to the hospital by car. The discharge information has been reviewed with the patient. The patient verbalized understanding. Discharge medications reviewed with the patient and appropriate educational materials and side effects teaching were provided.   ___________________________________________________________________________________________________________________________________

## 2017-12-13 NOTE — PROGRESS NOTES
Pt doing well, no complaints. Eating, voiding, ambulating.     AFVSS  Neuro intact  Wound OK    Stable, instructed for D/C

## 2017-12-18 ENCOUNTER — PATIENT OUTREACH (OUTPATIENT)
Dept: CASE MANAGEMENT | Age: 77
End: 2017-12-18

## 2017-12-18 NOTE — PROGRESS NOTES
Downtime progress note entry for [facility] : Luiz Cassidy. Quentin Hayes RN  Transition of Care Discharge Follow-up Questionnaire   Date/Time of Call:  Patient name:  :  N:   17 @ [address]  42  [id number]    What was the patient hospitalized for? Synovial cyst           Does the patient understand his/her diagnosis and/or treatment and what happened during the hospitalization? 2017 Called pt at both numbers available and unable to reach but did leave message at [phone]. Also called pcp Dr. Kaitlynn Ling and spoke with [facility] who states pt does not have follow up appt and is scheduled for wound ck with surgeon 17. Jessica will let pcp know of dc on 17. Did the patient receive discharge instructions? Review any discharge instructions (see notes in [facility]). Ask patient if they understand these. Do they have any questions? Were home services ordered (nursing, PT, OT, ST, etc.)? If so, has the first visit occurred? If not, why? (Assist with coordination of services if necessary.)          Was any DME ordered? If so, has it been received? If not, why?  (Assist with coordination of arranging DME orders if necessary.)          Complete a review of all medications (new, continued and discontinued meds per the D/C instructions and medication tab in [facility]). Were all new prescriptions filled? If not, why?  (Assist with obtainment of medications if necessary.)          Does the patient understand the purpose and dosing instructions for all medications? (If patient has questions, provide explanation and education.)    Does the patient have any problems in performing ADLs? (If patient is unable to perform ADLs - what is the limiting factor(s)?   Do they have a support system that can assist? If no support system is present, discuss possible assistance that they may be able to obtain.)              Does the patient have all follow-up appointments scheduled? 7 day f/up with PCP?    7-14 day f/up with specialist?    If f/up has not been made - what actions has the care coordinator made to accomplish this? Has transportation been arranged? [facility] follow-up should be within 7 days of discharge; all others should have PCP follow-up within 7 days of discharge; follow-ups with other specialists should be within 7-14 days of discharge.)    Any other questions or concerns expressed by the patient? Schedule next appointment with NIESHA WATTERS Coordinator or refer to RN Case Manager/  per the workflow guidelines. When is care coordinators next follow-up call scheduled? If referred for CCM - what RN care manager was the referral assigned? COLLINS Call Completed By: Shannan Coulter.  Jorge Jarrett RN

## 2017-12-18 NOTE — PROGRESS NOTES
Downtime progress note entry for [facility] : Hema Montez. Luretha Nyhan, RN  Transition of Care Discharge Follow-up Questionnaire   Date/Time of Call:  Patient name:  :  N:   17 @ [address]  42  [id number]    What was the patient hospitalized for? Synovial cyst/sp laminectomy of L3-L4     Does the patient understand his/her diagnosis and/or treatment and what happened during the hospitalization? YES-2017 Called pt at both numbers available and unable to reach but did leave message at [phone]. Also called pcp Dr. Pauline Roque and spoke with [facility] who states pt does not have follow up appt and is scheduled for wound ck with surgeon 17. Jessica will let pcp know of dc on 17. 17 2nd attempt to reach pt and states is doing well with no complaints but is having back pain. Pt state tolerating medications and is compliant. Pt also denies any S&S of infection and advised if occurs should contact surgeon. Pt states no issues with constipation at present time. Pt advised of to avoid lifting anything heavier than 5 lbs, do not soak in bath, and to report any fever, redness, bleeding, oozing or pain not relieved with percocet. Pt verbalizes an understanding. Did the patient receive discharge instructions? Yes     Review any discharge instructions (see notes in [facility]). Ask patient if they understand these. Do they have any questions? Yes and understand AVS.   Were home services ordered (nursing, PT, OT, ST, etc.)? No     If so, has the first visit occurred? If not, why? (Assist with coordination of services if necessary.) Na     Was any DME ordered? No     If so, has it been received? If not, why?  (Assist with coordination of arranging DME orders if necessary.) Na     Complete a review of all medications (new, continued and discontinued meds per the D/C instructions and medication tab in [facility]).  Percocet 5/325 1-2 po q 4-6 hrs prn  Pericolace 2 po qd.     Were all new prescriptions filled? If not, why?  (Assist with obtainment of medications if necessary.) Yes         Does the patient understand the purpose and dosing instructions for all medications? (If patient has questions, provide explanation and education.) Yes   Does the patient have any problems in performing ADLs? (If patient is unable to perform ADLs - what is the limiting factor(s)? Do they have a support system that can assist? If no support system is present, discuss possible assistance that they may be able to obtain.) No issues and states has cane and walker if needed. Pt has family that is lives with her and provides support and transportation as needed. Does the patient have all follow-up appointments scheduled? 7 day f/up with PCP?    7-14 day f/up with specialist?    If f/up has not been made - what actions has the care coordinator made to accomplish this? Has transportation been arranged? [facility] follow-up should be within 7 days of discharge; all others should have PCP follow-up within 7 days of discharge; follow-ups with other specialists should be within 7-14 days of discharge.) PCP-no and advised pt of the importance of 3-5 day follow up post discharge and states was seen 12/11/17 the day before surgery. Wound ck-12/21/17  Offered 3 way call to pcp Dr. Nathanael Nissen to assist with scheduling an appt and pt agrees but states does not want to be seen until 2-3 wks. Called pcp and spoke with [facility] who scheduled pt for 1/3/18. Pt confirmed date and time. No need for transportation arrangement. Any other questions or concerns expressed by the patient? No   Schedule next appointment with NIESHA Lara or refer to RN Case Manager/  per the workflow guidelines. When is care coordinators next follow-up call scheduled? If referred for CCM - what RN care manager was the referral assigned? Week of December 18th, 2017. COLLINS Call Completed By: Silvia Ball.  Prisca Doe RN

## 2017-12-23 ENCOUNTER — PATIENT OUTREACH (OUTPATIENT)
Dept: CASE MANAGEMENT | Age: 77
End: 2017-12-23

## 2018-06-12 PROBLEM — R12 HEARTBURN: Chronic | Status: ACTIVE | Noted: 2018-06-12

## 2018-06-27 ENCOUNTER — HOSPITAL ENCOUNTER (OUTPATIENT)
Dept: GENERAL RADIOLOGY | Age: 78
Discharge: HOME | End: 2018-06-27
Payer: MEDICARE

## 2018-06-27 DIAGNOSIS — M54.41 CHRONIC BILATERAL LOW BACK PAIN WITH BILATERAL SCIATICA: ICD-10-CM

## 2018-06-27 DIAGNOSIS — M54.42 CHRONIC BILATERAL LOW BACK PAIN WITH BILATERAL SCIATICA: ICD-10-CM

## 2018-06-27 DIAGNOSIS — G89.29 CHRONIC BILATERAL LOW BACK PAIN WITH BILATERAL SCIATICA: ICD-10-CM

## 2018-06-27 PROCEDURE — 72110 X-RAY EXAM L-2 SPINE 4/>VWS: CPT

## 2018-06-28 NOTE — PROGRESS NOTES
Notify patient low back x-ray continues to show advanced chronic changes. There does not appear to be a significant change in the imaging when compared to the prior study. Would she like to continue follow up with neurosurgery or proceed with pain management referral to assist with her discomfort? We could also try PT if willing.

## 2018-06-28 NOTE — PROGRESS NOTES
I called patient to inform them of abnormal lab/radiology results. Patient verbalized understanding on all.  Patient would like to move forward at this time to the pain management referral.

## 2018-06-29 PROBLEM — E11.21 TYPE 2 DIABETES WITH NEPHROPATHY (HCC): Chronic | Status: ACTIVE | Noted: 2018-06-29

## 2018-06-29 PROBLEM — E11.21 TYPE 2 DIABETES WITH NEPHROPATHY (HCC): Status: ACTIVE | Noted: 2018-06-29

## 2018-06-29 NOTE — PROGRESS NOTES
I have placed the referral to pain management. If desires further work up of remainder of back prior to seeing pain specialist, please discuss further with Dr. Som Dukes at upcoming appointment. Either of us can order additional imaging.

## 2018-08-14 ENCOUNTER — HOSPITAL ENCOUNTER (OUTPATIENT)
Dept: MAMMOGRAPHY | Age: 78
Discharge: HOME | End: 2018-08-14
Attending: FAMILY MEDICINE
Payer: MEDICARE

## 2018-08-14 DIAGNOSIS — Z12.31 SCREENING MAMMOGRAM, ENCOUNTER FOR: ICD-10-CM

## 2018-08-14 PROCEDURE — 77067 SCR MAMMO BI INCL CAD: CPT

## 2019-12-23 PROBLEM — J43.1 PANLOBULAR EMPHYSEMA (HCC): Status: ACTIVE | Noted: 2019-12-23

## 2019-12-23 PROBLEM — R80.9 POSITIVE FOR MACROALBUMINURIA: Status: ACTIVE | Noted: 2019-12-23

## 2020-02-25 ENCOUNTER — HOSPITAL ENCOUNTER (OUTPATIENT)
Dept: MAMMOGRAPHY | Age: 80
Discharge: HOME | End: 2020-02-25
Attending: FAMILY MEDICINE
Payer: MEDICARE

## 2020-02-25 DIAGNOSIS — Z78.0 POSTMENOPAUSAL: ICD-10-CM

## 2020-02-25 PROCEDURE — 77080 DXA BONE DENSITY AXIAL: CPT

## 2022-08-30 NOTE — H&P
Subjective:                Nhan Celestin is a 78 y.o.  female who presents with several months' H/O very severe pain in the R L4 distribution to the knee. This began in June. She cannot identify a precipitating incident. She has paresthesias with this and feels the leg is subjectively weak. She needs a walker to get around. She has some shooting electrical sensations in the LLE, but that is not nearly as severe as the pain in the RLE.   The symptoms are constant with a significant mechanical component.              Patient Active Problem List     Diagnosis Date Noted    Spinal stenosis of lumbar region 10/05/2017    Right hip pain 10/05/2017    BMI 32.0-32.9,adult 09/07/2017    Type 2 diabetes mellitus with diabetic neuropathy, without long-term current use of insulin ([address] 06/23/2017    Essential hypertension 12/27/2016    Pre-ulcerative calluses 12/27/2016    Type 2 diabetes mellitus with pressure callus ([address] 12/27/2016    Bunion, right foot 12/27/2016    Nicotine dependence, cigarettes, uncomplicated 26/41/6756    Chronic low back pain 08/31/2016    Cervical spinal stenosis 06/17/2015    IBS (irritable bowel syndrome), diarrhea 08/13/2014    Diabetic neuropathy ([address] 08/13/2014    DM2 (diabetes mellitus, type 2) ([address] 08/13/2014    Depression      GERD (gastroesophageal reflux disease)      Arthritis, left hip, shoulders.      Mixed hyperlipidemia            Nina Palacios MD              Past Medical History:   Diagnosis Date    Arthritis       left hip, shoulders    Depression      Diabetes ([address] 2004    GERD (gastroesophageal reflux disease)      Hypertension      Mixed hyperlipidemia                     Past Surgical History:   Procedure Laterality Date    HX BREAST BIOPSY        HX COLONOSCOPY   5/2005     hyperplastic and lymphoid aggregate    HX COLONOSCOPY   06/05/2017    HX HERNIA REPAIR Right 1980    HX HYSTERECTOMY Right 1980    HX TUBAL LIGATION   1979                  Allergies   Allergen Reactions    Amoxicillin Vertigo    Januvia [Sitagliptin] Myalgia    Lyrica [Pregabalin] Swelling                   Family History   Problem Relation Age of Onset    COPD Mother      Hypertension Mother      Cancer Brother      Breast Cancer Neg Hx                Current Outpatient Prescriptions   Medication Sig    meloxicam (MOBIC) 7.5 mg tablet Take 1 Tab by mouth daily.  hydroCHLOROthiazide (HYDRODIURIL) 25 mg tablet TAKE 1 TABLET BY MOUTH EVERY DAY    cyclobenzaprine (FLEXERIL) 10 mg tablet TAKE 1 TABLET THREE TIMES DAILY AS NEEDED FOR MUSCLE SPASM(S)    simvastatin (ZOCOR) 40 mg tablet TAKE 1 TABLET EVERY NIGHT    glipiZIDE (GLUCOTROL) 5 mg tablet TAKE 1 TABLET TWICE DAILY    metFORMIN (GLUCOPHAGE) 1,000 mg tablet TAKE 1 TABLET TWICE DAILY WITH MEALS    HYDROcodone-acetaminophen (NORCO) 5-325 mg per tablet Take 1 Tab by mouth every eight (8) hours as needed for Pain. Max Daily Amount: 3 Tabs.  citalopram (CELEXA) 40 mg tablet TAKE 1 TABLET BY MOUTH DAILY.  Blood-Glucose Meter (ACCU-CHEK SYEDA PLUS METER) misc Check blood sugars BID. E11.9    glucose blood VI test strips (ACCU-CHEK SYEDA PLUS TEST STRP) strip Check blood sugars BID. E11.9    Lancets (ACCU-CHEK SOFTCLIX LANCETS) misc Check blood sugars BID. E11.9    lisinopril (PRINIVIL, ZESTRIL) 20 mg tablet Take  by mouth daily.  gabapentin (NEURONTIN) 300 mg capsule Take 3 Caps by mouth three (3) times daily.  amLODIPine (NORVASC) 2.5 mg tablet Take 1 Tab by mouth daily. Indications: hypertension    glucose blood VI test strips (ASCENSIA CONTOUR) strip Check blood sugars BID. E11.9    atenolol (TENORMIN) 50 mg tablet TAKE 1 TABLET BY MOUTH EVERY DAY FOR HYPERTENSION    dicyclomine (BENTYL) 10 mg capsule TAKE 1 CAPSULE THREE TIMES DAILY    pioglitazone (ACTOS) 15 mg tablet TAKE 1 TABLET EVERY DAY    omeprazole (PRILOSEC) 40 mg capsule Take 40 mg by mouth daily.     aspirin 81 mg chewable tablet Take 81 mg by mouth daily.  acetaminophen (TYLENOL) 500 mg tablet Take 1,000 mg by mouth three (3) times daily.  benzonatate (TESSALON) 200 mg capsule Take 1 Cap by mouth three (3) times daily as needed for Cough.  FOLIC ACID/MULTIVIT-MIN/LUTEIN (CENTRUM SILVER PO) Take 1 Tab by mouth daily.      No current facility-administered medications for this visit.           Review of Systems: A comprehensive review of systems was negative except for that written in the HPI.        Objective:      There were no vitals taken for this visit.     Physical Exam:         General:  Alert, cooperative, no distress, appears stated age. Eyes:  Conjunctivae/corneas clear. PERRL, EOMs intact. Fundi benign   Ears:  Normal TMs and external ear canals both ears. Nose: Nares normal. Septum midline. Mucosa normal. No drainage or sinus tenderness. Mouth/Throat: Lips, mucosa, and tongue normal. Teeth and gums normal.   Neck: Supple, symmetrical, trachea midline, no adenopathy, thyroid: no enlargment/tenderness/nodules, no carotid bruit and no JVD. Back:   Symmetric, no curvature. ROM normal. No CVA tenderness. Lungs:   Clear to auscultation bilaterally. Heart:  Regular rate and rhythm, S1, S2 normal, no murmur, click, rub or gallop. Abdomen:   Soft, non-tender. Bowel sounds normal. No masses,  No organomegaly. Extremities: Extremities normal, atraumatic, no cyanosis or edema. Pulses: 2+ and symmetric all extremities. Skin: Skin color, texture, turgor normal. No rashes or lesions   Lymph nodes: Cervical, supraclavicular, and axillary nodes normal.   Appearance: The patient is well developed, well nourished, provides a coherent history and is in no acute distress.         GCS15, A&Ox3. Speech is fluent and appropriate. Cranial Nerves:   Intact visual fields. Fundi are benign. KARINA, EOM's full, no nystagmus, no ptosis. Facial sensation is normal. Corneal reflexes are intact.  Facial movement is symmetric. Hearing is normal bilaterally. Palate is midline with normal sternocleidomastoid and trapezius muscles are normal. Tongue is midline. Motor:  5/5 strength in upper and lower proximal and distal muscles. Normal bulk and tone. No fasciculations. Reflexes:   Deep tendon reflexes 2+/4 and symmetrical.  Andrade's sign is negative bilaterally. There is no clonus present. Sensory:   Normal to touch, pinprick and vibration. Gait:  Moderately antalgic, favors R    Tremor:   No tremor noted. Cerebellar:  No cerebellar signs present. Romberg's sign is negative. Tandem gait is normal.  +SLR R       Assessment:      The MRI L-spine reveals a R L3-4 synovial cyst which produces severe stenosis at this level. There is a Gd I L4-5 spondylolisthesis present with moderate stenosis. There is also a L L5-S1 HNP which produces severe compromise of the L S1 nerve root.     The upright dynamic L-spine films reveal no abnormal motion.     Plan:      I do suspect the above L3-4 findings are the etiology of her symptoms. She wishes to proceed with surgery, which will involve a R L3-4 laminotomy for removal of the synovial cyst.  She understands the nature of the procedure and the risks and benefits. We will set this up at the earliest convenience. Advancement Flap (Double) Text: The defect edges were debeveled with a #15 scalpel blade.  Given the location of the defect and the proximity to free margins a double advancement flap was deemed most appropriate.  Using a sterile surgical marker, the appropriate advancement flaps were drawn incorporating the defect and placing the expected incisions within the relaxed skin tension lines where possible.    The area thus outlined was incised deep to adipose tissue with a #15 scalpel blade.  The skin margins were undermined to an appropriate distance in all directions utilizing iris scissors.

## (undated) DEVICE — INTENDED FOR TISSUE SEPARATION, AND OTHER PROCEDURES THAT REQUIRE A SHARP SURGICAL BLADE TO PUNCTURE OR CUT.: Brand: BARD-PARKER SAFETY BLADES SIZE 15, STERILE

## (undated) DEVICE — (D)PREP SKN CHLRAPRP APPL 26ML -- CONVERT TO ITEM 371833

## (undated) DEVICE — DRAPE TWL SURG 16X26IN BLU ORB04] ALLCARE INC]

## (undated) DEVICE — SURGIFOAM SPNG SZ 100

## (undated) DEVICE — SUTURE VCRL SZ 3-0 L18IN ABSRB VLT L26MM SH 1/2 CIR J774D

## (undated) DEVICE — TOOL 14MH30 LEGEND 14CM 3MM: Brand: MIDAS REX ™

## (undated) DEVICE — BAND RUB 1/8X2.5IN STRL --

## (undated) DEVICE — DRAPE SHT 3 QTR PROXIMA 53X77 --

## (undated) DEVICE — KENDALL SCD EXPRESS SLEEVES, KNEE LENGTH, MEDIUM: Brand: KENDALL SCD

## (undated) DEVICE — DRAPE MICSCP W51XL150IN FOR LEICA M680 WILD OHS

## (undated) DEVICE — 1010 S-DRAPE TOWEL DRAPE 10/BX: Brand: STERI-DRAPE™

## (undated) DEVICE — 2000CC GUARDIAN II: Brand: GUARDIAN

## (undated) DEVICE — BLADE ASSEMB CLP HAIR FINE --

## (undated) DEVICE — DERMABOND SKIN ADH 0.7ML -- DERMABOND ADVANCED 12/BX

## (undated) DEVICE — SURGICAL PROCEDURE PACK MIN CV CDS

## (undated) DEVICE — REM POLYHESIVE ADULT PATIENT RETURN ELECTRODE: Brand: VALLEYLAB

## (undated) DEVICE — SOLUTION IV 1000ML 0.9% SOD CHL

## (undated) DEVICE — NEEDLE SPNL 22GA L3.5IN BLK HUB S STL REG WALL FIT STYL W/

## (undated) DEVICE — ACCY PA100-A LEGEND LUB/DIFFUSER 4 PACK: Brand: MIDAS REX®

## (undated) DEVICE — SUTURE VCRL SZ 0 L18IN ABSRB VLT L26MM CT-2 1/2 CIR J727D

## (undated) DEVICE — AMD ANTIMICROBIAL NON-ADHERENT PAD,0.2% POLYHEXAMETHYLENE BIGUANIDE HCI (PHMB): Brand: TELFA